# Patient Record
Sex: MALE | Race: WHITE | NOT HISPANIC OR LATINO | Employment: FULL TIME | URBAN - NONMETROPOLITAN AREA
[De-identification: names, ages, dates, MRNs, and addresses within clinical notes are randomized per-mention and may not be internally consistent; named-entity substitution may affect disease eponyms.]

---

## 2017-12-08 ENCOUNTER — TELEPHONE (OUTPATIENT)
Dept: FAMILY MEDICINE CLINIC | Facility: CLINIC | Age: 62
End: 2017-12-08

## 2018-01-04 ENCOUNTER — CLINICAL SUPPORT (OUTPATIENT)
Dept: FAMILY MEDICINE CLINIC | Facility: CLINIC | Age: 63
End: 2018-01-04

## 2018-01-04 VITALS
OXYGEN SATURATION: 97 % | WEIGHT: 235 LBS | DIASTOLIC BLOOD PRESSURE: 78 MMHG | SYSTOLIC BLOOD PRESSURE: 120 MMHG | HEART RATE: 62 BPM

## 2018-01-04 DIAGNOSIS — Z23 NEED FOR VACCINATION: ICD-10-CM

## 2018-01-04 DIAGNOSIS — Z00.00 PHYSICAL EXAM: Primary | ICD-10-CM

## 2018-01-04 PROCEDURE — 90691 TYPHOID VACCINE IM: CPT | Performed by: FAMILY MEDICINE

## 2018-01-04 PROCEDURE — 90632 HEPA VACCINE ADULT IM: CPT | Performed by: FAMILY MEDICINE

## 2018-01-04 PROCEDURE — 90471 IMMUNIZATION ADMIN: CPT | Performed by: FAMILY MEDICINE

## 2018-01-04 PROCEDURE — 90472 IMMUNIZATION ADMIN EACH ADD: CPT | Performed by: FAMILY MEDICINE

## 2018-01-04 PROCEDURE — 99212 OFFICE O/P EST SF 10 MIN: CPT | Performed by: FAMILY MEDICINE

## 2018-01-04 RX ORDER — CIPROFLOXACIN 250 MG/1
TABLET, FILM COATED ORAL
Qty: 10 TABLET | Refills: 0 | Status: SHIPPED | OUTPATIENT
Start: 2018-01-04

## 2018-01-04 NOTE — PROGRESS NOTES
Subjective   Ty Lugo is a 62 y.o. male.     History of Present Illness The patient is traveli n to [facility] for a [place] trip. He needs Vaccinations.    The following portions of the patient's history were reviewed and updated as appropriate: allergies, current medications, past family history, past medical history, past social history, past surgical history and problem list.    Review of Systems    Objective   Physical Exam   Constitutional: He appears well-developed and well-nourished.   HENT:   Head: Normocephalic and atraumatic.   Right Ear: External ear normal.   Left Ear: External ear normal.   Nose: Nose normal.   Mouth/Throat: Oropharynx is clear and moist.   Eyes: Pupils are equal, round, and reactive to light.   Cardiovascular: Normal rate, regular rhythm and normal heart sounds.  Exam reveals no gallop and no friction rub.    No murmur heard.  Pulmonary/Chest: Effort normal and breath sounds normal. No respiratory distress. He has no wheezes. He has no rales.   Abdominal: Soft. Bowel sounds are normal.   Vitals reviewed.      Assessment/Plan   Ty was seen today for annual exam.    Diagnoses and all orders for this visit:    Physical exam    Need for vaccination    Other orders  -     Typhoid VICPS Vaccine Im  -     Hepatitis A Vaccine Adult IM  -     ciprofloxacin (CIPRO) 250 MG tablet; One tablet by mouth q 12 hours for 3 doses.        I discussed food and water precautions.  I discussed blood borne pathogen avoidance.  I discussed indications for use of antibiotics for traveler's diarrhea.  I discussed avoidance of Rabies by avoiding the carriers.  I prescribed an antibiotic for traveler's diarrhea.

## 2018-01-05 NOTE — PATIENT INSTRUCTIONS
I discussed food and water precautions.  I discussed blood borne pathogen avoidance.  I discussed indications for use of antibiotics for traveler's diarrhea.  I discussed avoidance of Rabies by avoiding the carriers.  I prescribed an antibiotic for traveler's diarrhea.

## 2018-06-11 ENCOUNTER — CLINICAL SUPPORT (OUTPATIENT)
Dept: FAMILY MEDICINE CLINIC | Facility: CLINIC | Age: 63
End: 2018-06-11

## 2018-06-11 DIAGNOSIS — Z23 IMMUNIZATION DUE: Primary | ICD-10-CM

## 2018-06-11 PROCEDURE — 90471 IMMUNIZATION ADMIN: CPT | Performed by: FAMILY MEDICINE

## 2018-06-11 PROCEDURE — 90632 HEPA VACCINE ADULT IM: CPT | Performed by: FAMILY MEDICINE

## 2018-06-11 NOTE — PATIENT INSTRUCTIONS
Immunization Schedule, Adult  Recommended immunizations  These include:  · Influenza vaccine.  ¨ All adults should be immunized every year.  ¨ All adults, including pregnant women and people with hives-only allergy to eggs can receive the inactivated influenza vaccine (IIV) or recombinant influenza vaccine (MICHAEL).  ¨ Adults aged 18-64 years can receive the IIV or MICHAEL. The MICHAEL vaccine does not contain any egg protein.  ¨ Adults aged 65 years or older can receive the high-dose or adjuvanted IIV.  · Tetanus, diphtheria, and acellular pertussis (Td, Tdap) vaccine.  ¨ Pregnant women should receive 1 dose of Tdap vaccine during each pregnancy. The dose should be obtained regardless of the length of time since the last dose. Immunization is preferred during the 27th to 36th week of gestation.  ¨ An adult who has not previously received Tdap or who does not know his or her vaccine status should receive 1 dose of Tdap. This initial dose should be followed by tetanus and diphtheria toxoids (Td) booster doses every 10 years.  ¨ Adults with an unknown or incomplete history of completing a 3-dose immunization series with Td-containing vaccines should begin or complete a primary immunization series including a Tdap dose. The first 2 doses should be received at least 4 weeks apart, and the third dose 6-12 months after the second dose.  ¨ Adults should receive a Td booster every 10 years.  · Varicella vaccine.  ¨ An adult without evidence of immunity to varicella should receive 2 doses 4-8 weeks apart, or a second dose if he or she has previously received 1 dose.  ¨ Pregnant females who do not have evidence of immunity should receive the first dose after pregnancy. This first dose should be obtained before leaving the health care facility. The second dose should be obtained 4-8 weeks after the first dose.  ¨ All healthcare workers should have evidence of immunity to varicella.  ¨ Adults with cancer or those who are on therapy to  suppress the immune system should not receive the varicella vaccine.  · Human papillomavirus (HPV) vaccine.  ¨ Females aged 13-26 years who have not received the vaccine previously should obtain the 3-dose series. Females should receive the second dose 1-2 months after the first dose, and the third dose 6 months after the first dose.  ¨ The vaccine is not recommended for use in pregnant females. However, pregnancy testing is not needed before receiving a dose. If a female is found to be pregnant after receiving a dose, no treatment is needed. In that case, the remaining doses should be delayed until after the pregnancy.  ¨ Males aged 13-21 years who have not received the vaccine previously should receive the 3-dose series. Males aged 22-26 years may also receive a 3 dose series. Males should receive the second dose 1-2 months after the first dose, and the third dose 6 months after the first dose.  ¨ Adult females up to age 26 years and adult males up to age 21 years who initiated the HPV vaccine series before age 15 years and received 2 doses at least 5 months apart do not need an additional dose of the vaccine.  ¨ Adult females up to age 26 years and adult male up to age 21 years who initiated the HPV vaccine series before 15 years but only received 1 dose or 2 doses less than 5 months apart should receive 1 additional dose of the vaccine.  ¨ Immunization is recommended through the age of 26 years for any male who has sex with males and did not get any or all doses earlier.  ¨ Immunization is recommended for any person with an immunocompromised condition through the age of 26 years if he or she did not get any or all doses earlier.  · Zoster vaccine.  ¨ One dose is recommended for adults aged 60 years or older unless certain conditions are present.  · Measles, mumps, and rubella (MMR) vaccine.  ¨ Adults born before 1957 generally are considered immune to measles and mumps.  ¨ Adults born in 1957 or later should  have 1 or more doses of MMR vaccine unless there is a contraindication to the vaccine or there is laboratory evidence of immunity to each of the three diseases.  ¨ A routine second dose of MMR vaccine should be obtained at least 28 days after the first dose for students attending postsecondary schools, health care workers, or international travelers.  ¨ People who received inactivated measles vaccine or an unknown type of measles vaccine during [id number] should be revaccinated with 1 or 2 doses of MMR vaccine.  ¨ People who received inactivated mumps vaccine or an unknown type of mumps vaccine before 1979 and are at high risk for mumps infection should consider immunization with 2 doses of MMR vaccine.  ¨ For females of childbearing age, rubella immunity should be determined. If there is no evidence of immunity, females who are not pregnant should receive 1 dose of MMR. If there is no evidence of immunity, females who are pregnant should receive 1 dose of MMR after pregnancy and before leaving the healthcare facility.  ¨ Unvaccinated health care workers born before 1957 who lack laboratory evidence of measles, mumps, or rubella immunity or laboratory confirmation of disease should consider 2 doses of MMR 18 days apart for measles or mumps, or 1 dose of MMR for rubella.  · Pneumococcal vaccines.  ¨ All adults aged 65 years and older should receive 13-valent pneumococcal conjugate vaccine (PCV13) followed by 23-valent pneumococcal polyscaccharide vaccine (PPSV23) at least 1 year after PCV13.  ¨ An adult aged 19 years or older who has certain medical conditions and has not been previously immunized should receive 1 dose of PCV13 vaccine. This PCV13 should be followed with a dose of pneumococcal polysaccharide (PPSV23) vaccine. The PPSV23 vaccine dose should be obtained at least 8 weeks after the dose of PCV13 vaccine.  ¨ An adult aged 19 years or older who has certain medical conditions and previously received 1 or  more doses of PPSV23 vaccine should receive 1 dose of PCV13. The PCV13 vaccine dose should be obtained 1 or more years after the last PPSV23 vaccine dose.  ¨ PPSV23 vaccination should happen in all adults aged 19-64 who smoke cigarettes.  ¨ People with an immunocompromised condition and certain other conditions should receive both PCV13 and PPSV23 vaccines.  ¨ When indicated, people who have unknown immunization and have no record of immunization should receive PPSV23 vaccine.  ¨ People who received 1-2 doses of PPSV23 before age 65 years should receive another dose of PPSV23 vaccine at age 65 years or later if at least 5 years have passed since the previous dose.  ¨ Doses of PPSV23 are not needed for people immunized with PPSV23 at or after age 65 years.  · Meningococcal vaccine.  ¨ Adults with asplenia or persistent complement component deficiencies should receive 2 doses of quadrivalent meningococcal conjugate (MenACWY-D) vaccine. The doses should be obtained at least 2 months apart. Revaccination should occur every 5 years. A 2-dose or 3-dose series of serogroup B meningococcal (MenB)vaccine should also be obtained.  ¨ Microbiologists working with certain meningococcal bacteria,  recruits, people at risk during an outbreak, and people who travel to or live in countries with a high rate of meningitis should be immunized. Revaccination is recommended every 5 years if the risk for infection remains.  ¨ Adults with HIV infection who have not been previously vaccinated should receive a 2-dose MenACWY series with doses at least 2 months apart. If 1 dose was received previously, a second dose should be obtained at least 2 months later. Revaccination is recommended every 5 years.  ¨ A first-year college student up through age 21 years who is living in a residence sorensen should receive a dose if he or she did not receive a dose on or after his or her 16th birthday.  ¨ Adults aged 16-23 years may receive 2 doses of  MenB vaccine for short-term protection against most strains of serogroup B meningococcal disease.  · Hepatitis A vaccine.  ¨ Adults who wish to be protected from this disease, have certain high-risk conditions, work with hepatitis A-infected animals, work in hepatitis A research labs, or travel to or work in countries with a high rate of hepatitis A should be immunized.  ¨ Adults who were previously unvaccinated and who anticipate close contact with an international adoptee during the first 60 days after arrival in the [place] from a country with a high rate of hepatitis A should be immunized.  ¨ The vaccine may be given as a 2 or 3-dose series by itself or in combination with the hepatitis B vaccine (HepB).  · Hepatitis B vaccine.  ¨ Adults who wish to be protected from this disease, may be exposed to blood or other infectious body fluids, are household contacts or sex partners of hepatitis B positive people, are clients or workers in certain care facilities, or travel to or work in countries with a high rate of hepatitis B should be immunized.  ¨ Adults with chronic liver disease, such as hepatitis C infection, cirrhosis, fatty liver disease, alcoholic liver disease, autoimmune hepatitis, and elevated liver chemistry levels should be vaccinated.  ¨ Pregnant women who are at risk for hepatitis B virus infection during pregnancy should be immunized.  ¨ The vaccine may be given as a 3-dose series by itself or in combination with the hepatitis A vaccine (HepA).  · Haemophilus influenzae type b (Hib) vaccine.  ¨ A previously unvaccinated person with asplenia or sickle cell disease or having a scheduled splenectomy should receive 1 dose of Hib vaccine. This should happen at least 14 days before the procedure.  ¨ Regardless of previous immunization, a recipient of a hematopoietic stem cell transplant should receive a 3-dose series, with at least 4 weeks between doses, 6-12 months after his or her successful  transplant.  ¨ Hib vaccine is not recommended for adults with HIV infection.  This information is not intended to replace advice given to you by your health care provider. Make sure you discuss any questions you have with your health care provider.  Document Released: 03/09/2005 Document Revised: 08/30/2017 Document Reviewed: 08/30/2017  [employer] Interactive Patient Education © 2017 [employer]

## 2023-07-19 NOTE — TELEPHONE ENCOUNTER
Goal Outcome Evaluation:  Plan of Care Reviewed With: patient   Vss, numbness present in BLE, pt reports improvement in numbness since before sx, ambulating assist x1 with walker, small amount of drainage on dressing, HV in place with 75 cc output, plans to d/c home vs SNF, educated on bp monitoring.     Progress: improving             PT GOING TO [place], LEAVING JAN 16, 2018 AND SAID THAT HE WILL NEED HEP A AND TYPHOID.           HE IS GOING WITH DR XAVIER